# Patient Record
Sex: MALE | Race: WHITE
[De-identification: names, ages, dates, MRNs, and addresses within clinical notes are randomized per-mention and may not be internally consistent; named-entity substitution may affect disease eponyms.]

---

## 2017-10-03 ENCOUNTER — HOSPITAL ENCOUNTER (EMERGENCY)
Dept: HOSPITAL 74 - JER | Age: 42
LOS: 1 days | Discharge: HOME | End: 2017-10-04
Payer: COMMERCIAL

## 2017-10-03 VITALS — BODY MASS INDEX: 26.6 KG/M2
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
## 2017-10-03 DIAGNOSIS — F10.10: Primary | ICD-10-CM
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
## 2017-10-04 VITALS — HEART RATE: 103 BPM | TEMPERATURE: 98.7 F | DIASTOLIC BLOOD PRESSURE: 76 MMHG | SYSTOLIC BLOOD PRESSURE: 105 MMHG

## 2017-10-04 LAB
ALBUMIN SERPL-MCNC: 4.3 G/DL (ref 3.4–5)
ALP SERPL-CCNC: 63 U/L (ref 45–117)
ALT SERPL-CCNC: 33 U/L (ref 12–78)
ANION GAP SERPL CALC-SCNC: 8 MMOL/L (ref 8–16)
AST SERPL-CCNC: 30 U/L (ref 15–37)
BASOPHILS # BLD: 0.5 % (ref 0–2)
BILIRUB SERPL-MCNC: 0.2 MG/DL (ref 0.2–1)
CALCIUM SERPL-MCNC: 8.3 MG/DL (ref 8.5–10.1)
CO2 SERPL-SCNC: 28 MMOL/L (ref 21–32)
CREAT SERPL-MCNC: 0.8 MG/DL (ref 0.7–1.3)
DEPRECATED RDW RBC AUTO: 13 % (ref 11.9–15.9)
EOSINOPHIL # BLD: 0.6 % (ref 0–4.5)
GLUCOSE SERPL-MCNC: 115 MG/DL (ref 74–106)
MCH RBC QN AUTO: 31.8 PG (ref 25.7–33.7)
MCHC RBC AUTO-ENTMCNC: 34.4 G/DL (ref 32–35.9)
MCV RBC: 92.5 FL (ref 80–96)
NEUTROPHILS # BLD: 84.5 % (ref 42.8–82.8)
PLATELET # BLD AUTO: 237 K/MM3 (ref 134–434)
PMV BLD: 8.4 FL (ref 7.5–11.1)
PROT SERPL-MCNC: 7.4 G/DL (ref 6.4–8.2)
WBC # BLD AUTO: 10 K/MM3 (ref 4–10)

## 2017-10-04 NOTE — PDOC
*Physical Exam





- Vital Signs


 Last Vital Signs











Temp Pulse Resp BP Pulse Ox


 


 98.7 F   103 H  14   105/76   98 


 


 10/04/17 00:16  10/04/17 00:16  10/04/17 00:16  10/04/17 00:16  10/04/17 00:16














ED Treatment Course





- LABORATORY


CBC & Chemistry Diagram: 


 10/04/17 01:00





 10/04/17 01:00





- ADDITIONAL ORDERS


Additional order review: 


 Laboratory  Results











  10/04/17 10/04/17





  01:00 01:00


 


Sodium  144 


 


Potassium  4.4  D 


 


Chloride  108 H 


 


Carbon Dioxide  28 


 


Anion Gap  8 


 


BUN  6 L 


 


Creatinine  0.8 


 


Creat Clearance w eGFR  > 60 


 


Random Glucose  115 H 


 


Calcium  8.3 L 


 


Total Bilirubin  0.2  D 


 


AST  30  D 


 


ALT  33  D 


 


Alkaline Phosphatase  63  D 


 


Total Protein  7.4 


 


Albumin  4.3 


 


Alcohol, Quantitative   213.5 H*








 











  10/04/17





  01:00


 


RBC  4.81


 


MCV  92.5


 


MCHC  34.4


 


RDW  13.0


 


MPV  8.4


 


Neutrophils %  84.5 H D


 


Lymphocytes %  10.4  D


 


Monocytes %  4.0


 


Eosinophils %  0.6


 


Basophils %  0.5














*DC/Admit/Observation/Transfer


Diagnosis at time of Disposition: 


 Alcohol abuse





- Discharge Dispostion


Disposition: HOME


Condition at time of disposition: Stable


Admit: No





- Prescriptions


Prescriptions: 


Ibuprofen 800 mg PO TID #60 tablet


Methocarbamol [Robaxin -] 500 mg PO TID #60 tablet





- Referrals


Referrals: 


Lb Hernandez MD [Primary Care Provider] - 





- Patient Instructions


Printed Discharge Instructions:  [facility] for Alcohol Abuse





- Post Discharge Activity

## 2017-10-04 NOTE — PDOC
History of Present Illness





<Tabitha Salcido Veronica - Last Filed: 10/04/17 00:21>





- History of Present Illness


Initial Comments: 





10/04/17 00:27


The patient is a 42 year old male who was BIBA, with no significant past 

medical history, who presents to the emergency department with right rib pain s/

p fall. Patient is in an intoxicated state and has been drinking. He states 

that he fell at home earlier but is not exactly sure where he fell. Patient 

states he has broken his ribs before.  History is limited due to patient's 

altered mental status.  





He denies any recent fevers, chills, headache or dizziness. He denies any 

recent nausea, vomit, diarrhea or constipation. He denies any recent chest pain 

or shortness of breath. He denies any recent dysuria, frequency, urgency or 

hematuria.





Allergies: NKA


Past surgical history: None reported.


Social History: Nonsmoker. EtOH use.


Primary Care Physician: Lb Hernandez








<Theresa Floyd - Last Filed: 10/04/17 00:30>





- General


Chief Complaint: Injury


Stated Complaint: INTOX


Time Seen by Provider: 10/04/17 00:03





Past History





- Suicide/Smoking/Psychosocial Hx


Smoking Status: No


Smoking History: Unknown if ever smoked


Number of Cigarettes Smoked Daily: 0


Information on smoking cessation initiated: No


Hx Alcohol Use: No


Drug/Substance Use Hx: No





<Tabitha Salcido Veronica - Last Filed: 10/04/17 00:21>





<Theresa Floyd - Last Filed: 10/04/17 00:30>





- Past Medical History


Allergies/Adverse Reactions: 


 Allergies











Allergy/AdvReac Type Severity Reaction Status Date / Time


 


No Known Allergies Allergy   Verified 10/04/17 00:16














**Review of Systems





- Review of Systems


Able to Perform ROS?: No (Patient inebriated)


Comments:: 





10/04/17 00:29


CONSTITUTIONAL:


Absent: fever, no chills, no fatigue


EYES:


Absent: visual changes


ENT:


Absent: ear pain, no sore throat


CARDIOVASCULAR:


Absent: chest pain, no palpitations


RESPIRATORY:


Absent: cough, no SOB


GI:


Absent: abdominal pain, no nausea, no vomiting, no constipation, no diarrhea


GENITOURINARY:


Absent: dysuria, no frequency, no hematuria


MUSCULOSKELETAL:


Present: rt-sided rib pain


Absent: back pain, no arthralgia, no myalgia


SKIN:


Absent: rash


NEURO:


Absent: headache








<Theresa Floyd - Last Filed: 10/04/17 00:30>





*Physical Exam





- Vital Signs


 Last Vital Signs











Temp Pulse Resp BP Pulse Ox


 


 98.7 F   103 H  14   105/76   98 


 


 10/04/17 00:16  10/04/17 00:16  10/04/17 00:16  10/04/17 00:16  10/04/17 00:16














<Tabitha Salcido - Last Filed: 10/04/17 00:21>





- Vital Signs


 Last Vital Signs











Temp Pulse Resp BP Pulse Ox


 


 98.7 F   103 H  14   105/76   98 


 


 10/04/17 00:16  10/04/17 00:16  10/04/17 00:16  10/04/17 00:16  10/04/17 00:16














- Physical Exam


Comments: 


10/04/17 00:28


GENERAL: 


Inebriated. 


HEENT: 


Normocephalic, atraumatic. PERRL, EOM intact.


CARDIOVASCULAR: 


Normal S1, S2. Regular rate and rhythm.


PULMONARY: 


Clear to auscultation bilaterally.


ABDOMEN: 


+right rib tender to palpation. Soft, non-distended.


EXTREMITIES: 


Normal ROM in all four extremities. No gross deformities.


SKIN: 


Warm, dry.  No rash


NEUROLOGICAL: 


No focal neurological deficits.








<Theresa Floyd - Last Filed: 10/04/17 00:30>





*DC/Admit/Observation/Transfer





- Attestations


Scribe Attestion: 





10/04/17 00:30





Documentation prepared by Theresa Floyd, acting as medical scribe for Tabitha Salcido MD.





<Theresa Floyd - Last Filed: 10/04/17 00:30>

## 2018-10-20 ENCOUNTER — HOSPITAL ENCOUNTER (EMERGENCY)
Dept: HOSPITAL 74 - JERFT | Age: 43
Discharge: HOME | End: 2018-10-20
Payer: COMMERCIAL

## 2018-10-20 VITALS — BODY MASS INDEX: 29.9 KG/M2

## 2018-10-20 VITALS — SYSTOLIC BLOOD PRESSURE: 123 MMHG | HEART RATE: 90 BPM | TEMPERATURE: 98 F | DIASTOLIC BLOOD PRESSURE: 72 MMHG

## 2018-10-20 DIAGNOSIS — S92.411A: Primary | ICD-10-CM
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
## 2018-10-20 NOTE — PDOC
History of Present Illness





- General


Chief Complaint: Injury


Stated Complaint: RT FOOT FX, FALL


Time Seen by Provider: 10/20/18 18:58


History Source: Patient


Exam Limitations: No Limitations





- History of Present Illness


Initial Comments: 





10/20/18 19:14


43 yr male states he slipped walking down steps injured foot earlier this 

morning. no head trauma. Pt admits to taking percocet and drinking "a 40" to 

numb the pain. 


10/23/18 14:53








Past History





- Past Medical History


Allergies/Adverse Reactions: 


 Allergies











Allergy/AdvReac Type Severity Reaction Status Date / Time


 


No Known Allergies Allergy   Verified 10/20/18 18:26











COPD: No


CHF: No





- Suicide/Smoking/Psychosocial Hx


Smoking Status: No


Smoking History: Never smoked


Have you smoked in the past 12 months: No


Number of Cigarettes Smoked Daily: 0


Information on smoking cessation initiated: No


Hx Alcohol Use: No


Drug/Substance Use Hx: No


Substance Use Type: Alcohol





**Review of Systems





- Review of Systems


Able to Perform ROS?: Yes


Is the patient limited English proficient: No


Constitutional: No: Symptoms Reported


HEENTM: No: Symptoms Reported


Respiratory: No: Symptoms reported


Cardiac (ROS): No: Symptoms Reported


ABD/GI: No: Symptoms Reported


: No: Symptoms Reported


Musculoskeletal: Yes: Symptoms Reported


Integumentary: No: Symptoms Reported





*Physical Exam





- Vital Signs


 Last Vital Signs











Temp Pulse Resp BP Pulse Ox


 


 98 F   90   18   123/72   100 


 


 10/20/18 18:27  10/20/18 18:27  10/20/18 18:27  10/20/18 18:27  10/20/18 18:27














- Physical Exam


General Appearance: Yes: Nourished, Appropriately Dressed


HEENT: positive: EOMI, ARIAS


Neck: positive: Supple.  negative: Tender


Respiratory/Chest: positive: Lungs Clear, Normal Breath Sounds


Cardiovascular: positive: Regular Rhythm, Regular Rate


Musculoskeletal: positive: Normal Inspection


Extremity: positive: Normal Capillary Refill, Tender, Other (right foot with 

tenderness to the great toe, and the lateral foot )


Integumentary: positive: Normal Color, Dry, Warm


Neurologic: positive: Fully Oriented, Alert, Normal Mood/Affect, Normal Response

, Motor Strength 5/5





ED Treatment Course





- RADIOLOGY


Radiology Studies Ordered: 














 Category Date Time Status


 


 ANKLE & FOOT-RIGHT* [RAD] Stat Radiology  10/20/18 19:00 Ordered














Medical Decision Making





- Medical Decision Making





10/20/18 19:15


cc: pt slipped and fell down 3-4 steps injured his right foot. no head trauma. 

pt has history of spinal stenosis on pain meds and muscle relaxants. Pt also 

admits to drinking beer PTA with no relief from pain. 





xray is positive fro fracture of the first toe


ace wrap and hard sole shoe 


supportive care given


pt given all verbal dc inst 


no evidence of intoxication at time of discharge, pt ambulating with the hard 

sole shoe steady gait 





all questions asked and answered at dic pt understands the plan of care


10/23/18 14:54








*DC/Admit/Observation/Transfer


Diagnosis at time of Disposition: 


Toe fracture, right


Qualifiers:


 Encounter type: initial encounter Toe: great toe Fracture type: closed Phalanx

: proximal Fracture alignment: displaced Qualified Code(s): S92.411A - 

Displaced fracture of proximal phalanx of right great toe, initial encounter 

for closed fracture








- Discharge Dispostion


Disposition: HOME


Condition at time of disposition: Fair





- Referrals


Referrals: 


Santi Nicholas MD [Primary Care Provider] - 


Olu Ortiz MD [Staff Physician] - 





- Patient Instructions


Additional Instructions: 


you have a broken toe you must follow with the podiatrist call them Monday to 

set up appointment for next week 


elevate and apply ice every 2hrs for 20 minutes for the next 2 days while awake


take ibuprofen for pain (over the counter ) 


use the ace wrap and the hard sole shoe 


avoid excessive weight bearing 








- Post Discharge Activity

## 2021-03-07 ENCOUNTER — HOSPITAL ENCOUNTER (EMERGENCY)
Dept: HOSPITAL 74 - JERFT | Age: 46
Discharge: HOME | End: 2021-03-07
Payer: COMMERCIAL

## 2021-03-07 VITALS — TEMPERATURE: 97.8 F | HEART RATE: 88 BPM | DIASTOLIC BLOOD PRESSURE: 84 MMHG | SYSTOLIC BLOOD PRESSURE: 139 MMHG

## 2021-03-07 VITALS — BODY MASS INDEX: 25 KG/M2

## 2021-03-07 DIAGNOSIS — S20.212A: Primary | ICD-10-CM

## 2021-07-01 ENCOUNTER — HOSPITAL ENCOUNTER (EMERGENCY)
Dept: HOSPITAL 74 - JERFT | Age: 46
Discharge: HOME | End: 2021-07-01
Payer: COMMERCIAL

## 2021-07-01 VITALS — BODY MASS INDEX: 25 KG/M2

## 2021-07-01 VITALS — HEART RATE: 91 BPM | DIASTOLIC BLOOD PRESSURE: 91 MMHG | TEMPERATURE: 98.2 F | SYSTOLIC BLOOD PRESSURE: 132 MMHG

## 2021-07-01 DIAGNOSIS — M79.605: Primary | ICD-10-CM

## 2021-07-20 ENCOUNTER — HOSPITAL ENCOUNTER (EMERGENCY)
Dept: HOSPITAL 74 - JER | Age: 46
Discharge: LEFT BEFORE DISCHARGE | End: 2021-07-20
Payer: COMMERCIAL

## 2021-07-20 VITALS — SYSTOLIC BLOOD PRESSURE: 120 MMHG | DIASTOLIC BLOOD PRESSURE: 75 MMHG | TEMPERATURE: 98.5 F | HEART RATE: 102 BPM

## 2021-07-20 VITALS — BODY MASS INDEX: 25 KG/M2

## 2021-07-20 DIAGNOSIS — F10.10: Primary | ICD-10-CM

## 2021-11-12 ENCOUNTER — HOSPITAL ENCOUNTER (EMERGENCY)
Dept: HOSPITAL 74 - JERFT | Age: 46
Discharge: HOME | End: 2021-11-12
Payer: COMMERCIAL

## 2021-11-12 VITALS — TEMPERATURE: 98.2 F | HEART RATE: 99 BPM | SYSTOLIC BLOOD PRESSURE: 154 MMHG | DIASTOLIC BLOOD PRESSURE: 83 MMHG

## 2021-11-12 VITALS — BODY MASS INDEX: 24.1 KG/M2

## 2021-11-12 DIAGNOSIS — M54.50: Primary | ICD-10-CM
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
## 2025-04-27 ENCOUNTER — HOSPITAL ENCOUNTER (EMERGENCY)
Dept: HOSPITAL 74 - JERFT | Age: 50
Discharge: HOME | End: 2025-04-27
Payer: COMMERCIAL

## 2025-04-27 VITALS
SYSTOLIC BLOOD PRESSURE: 133 MMHG | HEART RATE: 82 BPM | RESPIRATION RATE: 18 BRPM | TEMPERATURE: 97.6 F | DIASTOLIC BLOOD PRESSURE: 84 MMHG

## 2025-04-27 VITALS — BODY MASS INDEX: 25 KG/M2
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
## 2025-04-27 DIAGNOSIS — S61.012A: Primary | ICD-10-CM

## 2025-04-27 PROCEDURE — 0XQKXZZ REPAIR LEFT HAND, EXTERNAL APPROACH: ICD-10-PCS | Performed by: EMERGENCY MEDICINE
